# Patient Record
Sex: FEMALE | Race: WHITE | Employment: FULL TIME | URBAN - METROPOLITAN AREA
[De-identification: names, ages, dates, MRNs, and addresses within clinical notes are randomized per-mention and may not be internally consistent; named-entity substitution may affect disease eponyms.]

---

## 2019-06-05 ENCOUNTER — OFFICE VISIT (OUTPATIENT)
Dept: ENDOCRINOLOGY | Age: 59
End: 2019-06-05
Payer: COMMERCIAL

## 2019-06-05 VITALS
HEART RATE: 69 BPM | BODY MASS INDEX: 27.64 KG/M2 | DIASTOLIC BLOOD PRESSURE: 68 MMHG | HEIGHT: 66 IN | WEIGHT: 172 LBS | SYSTOLIC BLOOD PRESSURE: 117 MMHG

## 2019-06-05 DIAGNOSIS — E11.29 TYPE 2 DIABETES MELLITUS WITH MICROALBUMINURIA, WITHOUT LONG-TERM CURRENT USE OF INSULIN (HCC): ICD-10-CM

## 2019-06-05 DIAGNOSIS — E66.3 OVERWEIGHT (BMI 25.0-29.9): ICD-10-CM

## 2019-06-05 DIAGNOSIS — I10 ESSENTIAL HYPERTENSION: ICD-10-CM

## 2019-06-05 DIAGNOSIS — R80.9 TYPE 2 DIABETES MELLITUS WITH MICROALBUMINURIA, WITHOUT LONG-TERM CURRENT USE OF INSULIN (HCC): ICD-10-CM

## 2019-06-05 DIAGNOSIS — E55.9 VITAMIN D DEFICIENCY: ICD-10-CM

## 2019-06-05 DIAGNOSIS — R53.82 CHRONIC FATIGUE: ICD-10-CM

## 2019-06-05 LAB — HBA1C MFR BLD: 7.2 %

## 2019-06-05 PROCEDURE — 83036 HEMOGLOBIN GLYCOSYLATED A1C: CPT | Performed by: NURSE PRACTITIONER

## 2019-06-05 PROCEDURE — 99204 OFFICE O/P NEW MOD 45 MIN: CPT | Performed by: NURSE PRACTITIONER

## 2019-06-05 RX ORDER — DOXYCYCLINE 100 MG/1
TABLET ORAL
COMMUNITY
Start: 2019-05-31 | End: 2019-09-04 | Stop reason: ALTCHOICE

## 2019-06-05 RX ORDER — LISINOPRIL 10 MG/1
TABLET ORAL
Refills: 3 | COMMUNITY
Start: 2019-04-12

## 2019-06-05 RX ORDER — GLIMEPIRIDE 2 MG/1
TABLET ORAL
COMMUNITY
Start: 2019-05-22 | End: 2020-03-11 | Stop reason: ALTCHOICE

## 2019-06-05 ASSESSMENT — ENCOUNTER SYMPTOMS
DIARRHEA: 0
SHORTNESS OF BREATH: 0
NAUSEA: 0
COLOR CHANGE: 0
CONSTIPATION: 0
EYE PAIN: 0

## 2019-06-05 NOTE — PATIENT INSTRUCTIONS
A1c today was 7.2%  Goal A1c < 6.5 %      Approach for  your diet  1. Use Calorieking. com to look up carb counts as discussed at visit  2. Target for 20 grams of carbohydrates or less per meal, and plan for three meals a day  3. Snacks if consumed should be low in carb, and avoid all processed snacks as far as possible  4. Decrease carb consumption in beverage form: regular soda, fruit juices  5. Moderate protein and good fats are ok. --> make protein and good fats as the first part of the meal  6. Increase fiber via vegetables. Limit fruit intake. 7. Eliminate use of sugar as far as possible, minimize  all artificial sweeteners. 8. Ensure good hydration: 80 -100 oz minimum  9. Ensure electrolyte replacement: powerade or gatorade ZERO or pedialyte etc.    Physical Activity  Recommended exercise is 5-7 days a week for 30-60 mins at least, per day OR a total of 2.5 hours per week , which ever is more feasible for you    Diabetic Health Maintenance  Follow up with annual eye exams  Follow up with annual podiatry exams  Check feet daily and make sure injuries do not go unnoticed. Other areas of Diabetic Education reviewed:   Carbs: good carbs and bad carbs, importance of carb counting, incorporation of protein with each meal to reduce Glycemic index, importance of portions, Carb/insulin ratio   Fats: Good fats and bad fats, meal planning and supplements.  Discussed how food affects blood sugar readings.     Steroids and effects on blood sugars   Different diabetic medications   Managing high and low sugar readings

## 2019-06-05 NOTE — ASSESSMENT & PLAN NOTE
Lab Results   Component Value Date    LABA1C 7.2 06/05/2019     Significantly improved A1c with carb restriction and medication compliance  Discussed starting GLP1 : patient declined for now  Had recent labs, not available at visit  Diet : 20-30 grams per meal , 3 meals per day, avoid carbs in snack choices  Ensure adequate hydration and  electrolyte replacement    Exercise :Recommended exercise is 5-7 days a week for 30-60 mins at least, per day OR a total of 2.5 hours per week , which ever is more feasible for you    Diabetic Health Maintenance  Follow up with annual eye exams  Follow up with annual podiatry exams  Check feet daily and make sure injuries do not go unnoticed. Other areas of Diabetic Education reviewed:   Carbs: good carbs and bad carbs, importance of carb counting, incorporation of protein with each meal to reduce Glycemic index, importance of portions, Carb/insulin ratio   Fats: Good fats and bad fats, meal planning and supplements.  Discussed how food affects blood sugar readings.     Different diabetic medications   Managing high and low sugar readings   Effects of smoking and diabetes

## 2019-06-05 NOTE — PROGRESS NOTES
Endocrinology  [facility]ario [facility] [place]  Phone: [phone]   FAX: [phone]    Claudeen Mcmurray is a 63 y.o. female who is a new patient presenting for management of Diabetes Mellitus Type 2. Last A1C:   Lab Results   Component Value Date    LABA1C 7.2 2019    LABA1C 9.7 2014     Last BP Readings:  BP Readings from Last 3 Encounters:   19 117/68     Last LDL:   Lab Results   Component Value Date    LDLCALC 122 (H) 2014     Aspirin Use: no    Tobacco/Alcohol History:    Smoking status: Never Smoker    Smokeless tobacco: Never Used    Alcohol use: Not on file     Diabetes:  Patricia Yee  was diagnosed with diabetes type 2 in : at age 37   On insulin: no   Patient reports that diabetes is generally not well controlled. Control progressively worsened after her   ~  4 years ago.  Works 2nd shift at [address] [employer] course has been variable :    Current microvascular complications include microalbuminuria   Current Macrovascular complications include no h/o chest pain, CVD, CAD, PVD   Patient reports compliance for about 80% of the time and adheres to medication, but usually not to diet and exercise instructions.  There have been no recent hospital or ED admissions for hypoglycemia, hyperglycemia or DKA.   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, sees Dr Jaswinder Goyal, nephrology ,   [facility] Other ED visit include for : none    PMH includes  Past Medical History:   Diagnosis Date    Diabetes mellitus ([address]        Blood glucose trends:  Patient brought log glucometer for download  Readings per day  morning per patient report  BG Range 88 -149  Hypoglycemia awareness and symptoms: none  Has not done CGM    Current Medication regimen:   Metformin 800 mg TID  Glimepiride  2 mg BID  Other meds tried in past: none  GFR n/a    Current Dietary regimen:   Eats 5 times a day  BF : 2 slices of turkey quinn plus egg white  Snack : cup of greek yoghurt  Lunch: cabbage mix per renal diet  Snack: fruit strawberries  Beverages: water/unsweetened tea  Average carbs per day: ~ 100 , but was much higher previously    Microvascular Complications:  · Neuropathy: tingling and numbness  · Retinopathy: no diabetic eye ds. RE immature cataract  · Nephropathy: membraneous nephropathy    Diabetic Health Maintenance   · Last Eye Exam: Dr Pascual Anne, last year  · Last Foot exam: with PCP, no loss of sensation  · Has patient seen a dietitian? Yes  · Current Exercise: No structured exercise  · On ACEI or ARB:lisinopril 10 mg BID    · On statin: no    Hyperlipidemia: Current complaints include occasional myalgias but otherwise tolerates well. Lab Results   Component Value Date    CHOL 201 09/27/2014     Lab Results   Component Value Date    TRIG 40 09/27/2014     Lab Results   Component Value Date    HDL 71 09/27/2014     Lab Results   Component Value Date    LDLCALC 122 09/27/2014     No results found for: LDLDIRECT  No results found for: CHOLHDLRATIO    Vitamin D deficiency: Currently is on not on rx / otc supplementation. Current complaints include fatigue on daily basis. Last vitamin Dlevel is:  No results found for: VD23T, VITD3, VD25, VITD25    Hypertension  Controlled , denies symptoms of dizziness, light headedness. Occasional dependent edema. Tries to follow a salt restricted diet.      Lab Results   Component Value Date     (L) 09/27/2014    K 3.5 09/27/2014     09/27/2014    CO2 26 09/27/2014    BUN 8 09/27/2014    CREATININE 0.5 (L) 09/27/2014    GLUCOSE 232 (H) 09/27/2014    CALCIUM 8.6 09/27/2014    PROT 6.4 09/27/2014    LABALBU 3.7 09/27/2014    BILITOT 0.3 09/27/2014    ALKPHOS 77 09/27/2014    AST 14 (L) 09/27/2014    ALT 15 09/27/2014    LABGLOM >60 09/27/2014    GFRAA >60 09/27/2014    AGRATIO 1.4 09/27/2014    GLOB 2.7 09/27/2014     The ASCVD Risk score (Lenora Warner, et al., 2013) failed to calculate for the following reasons: Cannot find a previous HDL lab    Cannot find a previous total cholesterol lab    History reviewed. No pertinent family history. Review of Systems   Constitutional: Negative for activity change, appetite change, diaphoresis, fever and unexpected weight change. HENT: Negative for dental problem. Eyes: Negative for pain and visual disturbance. Respiratory: Negative for shortness of breath. Cardiovascular: Negative for chest pain, palpitations and leg swelling. Gastrointestinal: Negative for constipation, diarrhea and nausea. Endocrine: Negative for cold intolerance, heat intolerance, polydipsia, polyphagia and polyuria. Genitourinary: Negative for frequency and urgency. Musculoskeletal: Negative for arthralgias, joint swelling and myalgias. Skin: Negative for color change and pallor. Neurological: Negative for weakness, numbness and headaches. Psychiatric/Behavioral: Negative for dysphoric mood and sleep disturbance. The patient is not nervous/anxious. Vitals:    06/05/19 0831   BP: 117/68   Pulse: 69   Weight: 172 lb (78 kg)   Height: 5' 6\" (1.676 m)     Physical Exam   Constitutional: She is oriented to person, place, and time. She appears well-developed and well-nourished. Eyes: Pupils are equal, round, and reactive to light. Neck: Normal range of motion. No thyromegaly present. Cardiovascular: Normal rate, regular rhythm and normal heart sounds. Pulmonary/Chest: Effort normal and breath sounds normal.   Abdominal: She exhibits no distension. Musculoskeletal: Normal range of motion. She exhibits no edema. Neurological: She is alert and oriented to person, place, and time. No sensory deficit. Skin: Skin is warm and dry. No skin changes or evidence of trauma. Psychiatric: She has a normal mood and affect. Her behavior is normal. Thought content normal.   Vitals reviewed. Skeletal foot exam: No skin lesions are noted. Skin is normal.  Calluses are not noted. WITH REFLEX TO FT4     DIABETES FOOT EXAM (Completed)    VITAMIN B12 & FOLATE    C-Peptide    Glutamic Acid Decarboxylase    INSULIN ANTIBODY    Essential hypertension     Blood pressure controlled. Continue current regimen         Relevant Medications    lisinopril (PRINIVIL;ZESTRIL) 10 MG tablet    Overweight (BMI 25.0-29. 9)     Has lost ~ 8 lbs with lower carb diet  Eats 5 small meals a day         Vitamin D deficiency     Ongoing fatigue   On 400 IU QD         Relevant Orders    Vitamin D 25 Hydroxy    Chronic fatigue     Reports ongoing fatigue > 1 year  Previous thyroid labs in ref range  Sleep is adequate, does not snore  Previously low B12 levels, does not currently suplement         Relevant Orders    TSH WITH REFLEX TO FT4    VITAMIN B12 & FOLATE        Greater than 40 minutes spent directly counseling patient about topics listed above (such as lifestyle modifications, preventative screenings and/or disease related processes. Return in about 1 month (around 7/5/2019).

## 2019-08-23 ENCOUNTER — HOSPITAL ENCOUNTER (OUTPATIENT)
Age: 59
Discharge: HOME | End: 2019-08-23
Payer: COMMERCIAL

## 2019-08-23 DIAGNOSIS — R53.82 CHRONIC FATIGUE: ICD-10-CM

## 2019-08-23 DIAGNOSIS — E11.29 TYPE 2 DIABETES MELLITUS WITH MICROALBUMINURIA, WITHOUT LONG-TERM CURRENT USE OF INSULIN (HCC): ICD-10-CM

## 2019-08-23 DIAGNOSIS — E55.9 VITAMIN D DEFICIENCY: ICD-10-CM

## 2019-08-23 DIAGNOSIS — R80.9 TYPE 2 DIABETES MELLITUS WITH MICROALBUMINURIA, WITHOUT LONG-TERM CURRENT USE OF INSULIN (HCC): ICD-10-CM

## 2019-08-23 LAB
CHOLESTEROL, TOTAL: 283 MG/DL (ref 0–199)
CREATININE URINE: 23.6 MG/DL (ref 28–259)
FOLATE: 7.92 NG/ML (ref 4.78–24.2)
HDLC SERPL-MCNC: 75 MG/DL (ref 40–60)
LDL CHOLESTEROL CALCULATED: 195 MG/DL
MICROALBUMIN UR-MCNC: 13 MG/DL
MICROALBUMIN/CREAT UR-RTO: 550.8 MG/G (ref 0–30)
TRIGL SERPL-MCNC: 67 MG/DL (ref 0–150)
TSH REFLEX FT4: 3.58 UIU/ML (ref 0.27–4.2)
VITAMIN B-12: 896 PG/ML (ref 211–911)
VITAMIN D 25-HYDROXY: 24.5 NG/ML
VLDLC SERPL CALC-MCNC: 13 MG/DL

## 2019-08-23 PROCEDURE — 82043 UR ALBUMIN QUANTITATIVE: CPT

## 2019-08-23 PROCEDURE — 82306 VITAMIN D 25 HYDROXY: CPT

## 2019-08-23 PROCEDURE — 36415 COLL VENOUS BLD VENIPUNCTURE: CPT

## 2019-08-23 PROCEDURE — 82607 VITAMIN B-12: CPT

## 2019-08-23 PROCEDURE — 86341 ISLET CELL ANTIBODY: CPT

## 2019-08-23 PROCEDURE — 82570 ASSAY OF URINE CREATININE: CPT

## 2019-08-23 PROCEDURE — 86337 INSULIN ANTIBODIES: CPT

## 2019-08-23 PROCEDURE — 84681 ASSAY OF C-PEPTIDE: CPT

## 2019-08-23 PROCEDURE — 80061 LIPID PANEL: CPT

## 2019-08-23 PROCEDURE — 82746 ASSAY OF FOLIC ACID SERUM: CPT

## 2019-08-23 PROCEDURE — 84443 ASSAY THYROID STIM HORMONE: CPT

## 2019-08-25 LAB
GLUTAMIC ACID DECARB AB: <5 IU/ML (ref 0–5)
INSULIN A: <0.4 U/ML (ref 0–0.4)

## 2019-08-28 LAB — C-PEPTIDE: 1.6 NG/ML (ref 1.1–4.4)

## 2019-09-04 ENCOUNTER — OFFICE VISIT (OUTPATIENT)
Dept: ENDOCRINOLOGY | Age: 59
End: 2019-09-04
Payer: COMMERCIAL

## 2019-09-04 VITALS
WEIGHT: 174.2 LBS | SYSTOLIC BLOOD PRESSURE: 99 MMHG | BODY MASS INDEX: 28 KG/M2 | HEIGHT: 66 IN | HEART RATE: 65 BPM | DIASTOLIC BLOOD PRESSURE: 63 MMHG | OXYGEN SATURATION: 100 %

## 2019-09-04 DIAGNOSIS — R80.9 TYPE 2 DIABETES MELLITUS WITH MICROALBUMINURIA, WITHOUT LONG-TERM CURRENT USE OF INSULIN (HCC): Primary | ICD-10-CM

## 2019-09-04 DIAGNOSIS — R53.82 CHRONIC FATIGUE: ICD-10-CM

## 2019-09-04 DIAGNOSIS — I10 ESSENTIAL HYPERTENSION: ICD-10-CM

## 2019-09-04 DIAGNOSIS — E55.9 VITAMIN D DEFICIENCY: ICD-10-CM

## 2019-09-04 DIAGNOSIS — E11.29 TYPE 2 DIABETES MELLITUS WITH MICROALBUMINURIA, WITHOUT LONG-TERM CURRENT USE OF INSULIN (HCC): Primary | ICD-10-CM

## 2019-09-04 DIAGNOSIS — E66.3 OVERWEIGHT (BMI 25.0-29.9): ICD-10-CM

## 2019-09-04 LAB — HBA1C MFR BLD: 5.8 %

## 2019-09-04 PROCEDURE — 83036 HEMOGLOBIN GLYCOSYLATED A1C: CPT | Performed by: NURSE PRACTITIONER

## 2019-09-04 PROCEDURE — 99214 OFFICE O/P EST MOD 30 MIN: CPT | Performed by: NURSE PRACTITIONER

## 2019-09-04 ASSESSMENT — ENCOUNTER SYMPTOMS
SHORTNESS OF BREATH: 0
EYE PAIN: 0
NAUSEA: 0
DIARRHEA: 0
COLOR CHANGE: 0
CONSTIPATION: 0

## 2019-09-04 NOTE — ASSESSMENT & PLAN NOTE
Reviewed labs  Low in serum protein and albumin   High in urine microalbumin  H/o membraneous nephropathy  Will discuss protein replacement with nephrology

## 2019-09-04 NOTE — PATIENT INSTRUCTIONS
HbA1c  4.0 4.1 4.2 4.3 4.4 4.5 4.6 4.7 4.8 4.9  Glucose 68 71 74 77 80 82 85 88 91 94    HbA1c  5.0 5.1 5.2 5.3 5.4 5.5 5.6 5.7 5.8 5.9  Glucose 97 100 103 105 108 111 114 117 120 123    HbA1c  6.0 6.1 6.2 6.3 6.4 6.5 6.6 6.7 6.8 6.9  Glucose 125 128 131 134 137 140 143 146 148 151    HbA1c  7.0 7.1 7.2 7.3 7.4 7.5 7.6 7.7 7.8 7.9  Glucose 154 157 160 163 166 169 171 174 177 180    HbA1c  8.0 8.1 8.2 8.3 8.4 8.5 8.6 8.7 8.8 8.9  Glucose 183 186 189 192 194 197 200 203 206 209    HbA1c  9.0 9.1 9.2 9.3 9.4 9.5 9.6 9.7 9.8 9.9  Glucose 212 214 217 220 223 226 229 232 235 237    HbA1c  10.0 10.1 10.2 10.3 10.4 10.5 10.6 10.7 10.8 10.9  Glucose 240 243 246 249 252 255 258 260 263 266    HbA1c  11.0 11.1 11.2 11.3 11.4 11.5 11.6 11.7 11.8 11.9  Glucose 269 272 275 278 280 283 286 289 292 295    HbA1c  12.0 12.1 12.2 12.3 12.4 12.5 12.6 12.7 12.8 12.9  Glucose 298 301 303 306 309 312 315 318 321 324    HbA1c  13.0 13.1 13.2 13.3 13.4 13.5 13.6 13.7 13.8 13.9  Glucose 326 329 332 335 338 341 344 346 349 352

## 2019-09-04 NOTE — ASSESSMENT & PLAN NOTE
Current BMI is 28.12  Goal is < 25 BMI  Reviewed carbohyrate and caloric restriction  Emphasis on former at this time  Ensure good hydration and adequate electrolyte replacement

## 2019-09-04 NOTE — PROGRESS NOTES
800 mg TID--> takes BID  Glimepiride  2 mg QD  Other meds tried in past: none  GFR n/a    Current Dietary regimen:   Eats 5 times a day  BF : 2 slices of turkey quinn plus egg white  Snack : cup of greek yoghurt  Lunch: cabbage mix per renal diet  Snack: fruit strawberries  Beverages: water/unsweetened tea  Average carbs per day: ~ 100 , but was much higher previously    Microvascular Complications:  · Neuropathy: tingling and numbness  · Retinopathy: no diabetic eye ds. RE immature cataract  · Nephropathy: membraneous nephropathy  Component      Latest Ref Rng & Units 8/23/2019   Microalbumin, Random Urine      <2.0 mg/dL 13.00 (H)   Creatinine, Ur      28.0 - 259.0 mg/dL 23.6 (L)   Microalbumin Creatinine Ratio      0.0 - 30.0 mg/g 550.8 (H)     Diabetic Health Maintenance   · Last Eye Exam: Dr Vin Noe, last year  · Last Foot exam: with PCP, no loss of sensation  · Has patient seen a dietitian? Yes  · Current Exercise: No structured exercise  · On ACEI or ARB:lisinopril 10 mg BID    · On statin: no    Hyperlipidemia: Current complaints include occasional myalgias but otherwise tolerates well. Lab Results   Component Value Date    CHOL 283 08/23/2019    CHOL 201 09/27/2014     Lab Results   Component Value Date    TRIG 67 08/23/2019    TRIG 40 09/27/2014     Lab Results   Component Value Date    HDL 75 08/23/2019    HDL 71 09/27/2014     Lab Results   Component Value Date    LDLCALC 195 08/23/2019    [address] 122 09/27/2014     No results found for: LDLDIRECT  No results found for: CHOLHDLRATIO    Vitamin D deficiency: Currently is on not on rx / otc supplementation. Current complaints include fatigue on daily basis. Last vitamin Dlevel is:  Lab Results   Component Value Date    VITD25 24.5 08/23/2019       Hypertension  Controlled , denies symptoms of dizziness, light headedness. Occasional dependent edema. Tries to follow a salt restricted diet.      Lab Results   Component Value Date     (L)

## 2020-03-11 ENCOUNTER — OFFICE VISIT (OUTPATIENT)
Dept: ENDOCRINOLOGY | Age: 60
End: 2020-03-11
Payer: COMMERCIAL

## 2020-03-11 VITALS
DIASTOLIC BLOOD PRESSURE: 68 MMHG | WEIGHT: 170.8 LBS | BODY MASS INDEX: 27.45 KG/M2 | SYSTOLIC BLOOD PRESSURE: 115 MMHG | OXYGEN SATURATION: 97 % | HEIGHT: 66 IN | HEART RATE: 65 BPM

## 2020-03-11 LAB — HBA1C MFR BLD: 6.6 %

## 2020-03-11 PROCEDURE — 99214 OFFICE O/P EST MOD 30 MIN: CPT | Performed by: INTERNAL MEDICINE

## 2020-03-11 PROCEDURE — 83036 HEMOGLOBIN GLYCOSYLATED A1C: CPT | Performed by: INTERNAL MEDICINE

## 2020-03-11 RX ORDER — PRAVASTATIN SODIUM 20 MG
20 TABLET ORAL DAILY
Qty: 30 TABLET | Refills: 5 | Status: SHIPPED | OUTPATIENT
Start: 2020-03-11 | End: 2020-09-09

## 2020-03-11 RX ORDER — MULTIVIT-MIN/IRON/FOLIC ACID/K 18-600-40
1000 CAPSULE ORAL WEEKLY
COMMUNITY

## 2020-03-11 RX ORDER — METFORMIN HYDROCHLORIDE 500 MG/1
500 TABLET, EXTENDED RELEASE ORAL
COMMUNITY
Start: 2020-02-09 | End: 2020-03-11

## 2020-03-11 RX ORDER — OMEGA-3/DHA/EPA/FISH OIL 500-1000MG
500 CAPSULE ORAL DAILY
COMMUNITY
End: 2020-03-11

## 2020-03-11 SDOH — HEALTH STABILITY: MENTAL HEALTH: HOW OFTEN DO YOU HAVE A DRINK CONTAINING ALCOHOL?: NEVER

## 2020-03-11 ASSESSMENT — ENCOUNTER SYMPTOMS
BACK PAIN: 0
COUGH: 0
PHOTOPHOBIA: 0

## 2020-03-11 NOTE — PROGRESS NOTES
Endocrinology  Jarocho Chance M.D. Phone: [phone]   FAX: [phone]       Lili Salinas   YOB: 1960    Date of Visit:  3/11/2020    Allergies   Allergen Reactions    Amoxicillin Rash    Augmentin [Amoxicillin-Pot Clavulanate] Rash     Outpatient Medications Marked as Taking for the 3/11/20 encounter (Office Visit) with Kofi Pelayo MD   Medication Sig Dispense Refill    metFORMIN (GLUCOPHAGE-XR) 500 MG extended release tablet Take 500 mg by mouth daily (with breakfast)       metFORMIN (GLUCOPHAGE) 850 MG tablet Take 850 mg by mouth 2 times daily (with meals)      Vitamin D, Cholecalciferol, 25 MCG (1000 UT) TABS Take 1,000 Units by mouth daily      Omega-3 Fatty Acids (OMEGA 3 500) 500 MG CAPS Take 500 mg by mouth daily      lisinopril (PRINIVIL;ZESTRIL) 10 MG tablet TAKE 1 TABLET BY MOUTH EVERY DAY  3         Vitals:    03/11/20 0856   BP: 115/68   Site: Right Upper Arm   Position: Sitting   Cuff Size: Medium Adult   Pulse: 65   SpO2: 97%   Weight: 170 lb 12.8 oz (77.5 kg)   Height: 5' 6\" (1.676 m)     Body mass index is 27.57 kg/m². Wt Readings from Last 3 Encounters:   03/11/20 170 lb 12.8 oz (77.5 kg)   09/04/19 174 lb 3.2 oz (79 kg)   06/05/19 172 lb (78 kg)     BP Readings from Last 3 Encounters:   03/11/20 115/68   09/04/19 99/63   06/05/19 117/68        Past Medical History:   Diagnosis Date    Diabetes mellitus ([facility] [address]      History reviewed. No pertinent surgical history. History reviewed. No pertinent family history. Social History     Tobacco Use   Smoking Status Never Smoker   Smokeless Tobacco Never Used      Social History     Substance and Sexual Activity   Alcohol Use Never    Frequency: Never       HPI      Lili Salinas is a 63 y.o. female who is here for  management uncontrolled DM. PCP :  DELORIS Bueno CNP    Patient has a PMH of Type 2 DM, hypertension, hyperlipidemia    Saw [facility] [place] in 2019.      Diagnosed with Diabetes Psychiatric:         Behavior: Behavior normal.         Thought Content: Thought content normal.           Lab Results   Component Value Date    LABA1C 5.8 09/04/2019         Assessment/Plan      1. Type 2 DM     Henny Nickerson is a 63 y.o. female has Type 2 DM   Controlled. A1c 5.8 % ---> 6.6 %       -Continue metformin 850 mg BID  -DC metformin XR        Advised follow-up with the ophthalmologist once year. Urine microalbumin/cr ratio normal high in 08/19 . On lisinopril. Discussed foot care. 2. Hypertension. BP at goal. On lisinopril 10 mg daily. 3. Hyperlipidemia. LDL high  HDL and TGD normal.     Discussed need to reduce the LDL. She had joint pains with atorvastatin    Will start her on paravastatin 20 mg daily. 4. Membranous nephropathy. As per nephrology. Given this was her first visit with me, reviewed history, previous office notes.

## 2020-09-09 RX ORDER — PRAVASTATIN SODIUM 20 MG
TABLET ORAL
Qty: 30 TABLET | Refills: 4 | Status: SHIPPED | OUTPATIENT
Start: 2020-09-09

## 2020-09-09 NOTE — TELEPHONE ENCOUNTER
Medication:   Requested Prescriptions     Pending Prescriptions Disp Refills    pravastatin (PRAVACHOL) 20 MG tablet [Pharmacy Med Name: PRAVASTATIN SODIUM 20 MG TAB] 30 tablet 4     Sig: TAKE ONE TABLET BY MOUTH DAILY         Last appt: 3/11/2020   Next appt: 9/11/2020    Last OARRS: No flowsheet data found.

## 2020-09-11 ENCOUNTER — OFFICE VISIT (OUTPATIENT)
Dept: ENDOCRINOLOGY | Age: 60
End: 2020-09-11
Payer: COMMERCIAL

## 2020-09-11 VITALS
BODY MASS INDEX: 28.61 KG/M2 | WEIGHT: 178 LBS | OXYGEN SATURATION: 100 % | DIASTOLIC BLOOD PRESSURE: 78 MMHG | HEIGHT: 66 IN | HEART RATE: 60 BPM | SYSTOLIC BLOOD PRESSURE: 131 MMHG

## 2020-09-11 PROCEDURE — 99214 OFFICE O/P EST MOD 30 MIN: CPT | Performed by: INTERNAL MEDICINE

## 2020-09-11 ASSESSMENT — ENCOUNTER SYMPTOMS
PHOTOPHOBIA: 0
COUGH: 0
BACK PAIN: 0

## 2020-09-11 NOTE — PROGRESS NOTES
Endocrinology  Gildardo Nevarez M.D. Phone: [phone]   FAX: [phone]       Ottoniel Pereira   YOB: 1960    Date of Visit:  9/11/2020    Allergies   Allergen Reactions    Amoxicillin Rash    Augmentin [Amoxicillin-Pot Clavulanate] Rash     Outpatient Medications Marked as Taking for the 9/11/20 encounter (Office Visit) with Keshav Moseley MD   Medication Sig Dispense Refill    pravastatin (PRAVACHOL) 20 MG tablet TAKE ONE TABLET BY MOUTH DAILY 30 tablet 4    metFORMIN (GLUCOPHAGE) 850 MG tablet Take 850 mg by mouth 2 times daily (with meals)      Vitamin D, Cholecalciferol, 25 MCG (1000 UT) TABS Take 1,000 Units by mouth once a week       lisinopril (PRINIVIL;ZESTRIL) 10 MG tablet TAKE 1 TABLET BY MOUTH EVERY DAY  3         Vitals:    09/11/20 0930   BP: 131/78   Site: Right Upper Arm   Position: Sitting   Cuff Size: Medium Adult   Pulse: 60   SpO2: 100%   Weight: 178 lb (80.7 kg)   Height: 5' 6\" (1.676 m)     Body mass index is 28.73 kg/m². Wt Readings from Last 3 Encounters:   09/11/20 178 lb (80.7 kg)   03/11/20 170 lb 12.8 oz (77.5 kg)   09/04/19 174 lb 3.2 oz (79 kg)     BP Readings from Last 3 Encounters:   09/11/20 131/78   03/11/20 115/68   09/04/19 99/63        Past Medical History:   Diagnosis Date    Diabetes mellitus ([facility] [address]      History reviewed. No pertinent surgical history. History reviewed. No pertinent family history. Social History     Tobacco Use   Smoking Status Never Smoker   Smokeless Tobacco Never Used      Social History     Substance and Sexual Activity   Alcohol Use Never    Frequency: Never       HPI      Ottoniel Pereira is a 62 y.o. female who is here for  management uncontrolled DM. PCP :  DELORIS Regan CNP    Patient has a PMH of Type 2 DM, hypertension, hyperlipidemia    Saw Alex Shahid in 2019. Diagnosed with Diabetes Mellitus type 2 at the age of 37 yrs. ,  Course has been variable .   Microvascular complications: No

## 2020-11-20 ENCOUNTER — TELEPHONE (OUTPATIENT)
Dept: ENDOCRINOLOGY | Age: 60
End: 2020-11-20

## 2020-11-20 NOTE — TELEPHONE ENCOUNTER
Called pt and l/m per Dr. Vero Kenney: Please advise patient her labs look good and to continue same medications.   11/20/2020 @ 200pm.

## 2021-03-17 ENCOUNTER — OFFICE VISIT (OUTPATIENT)
Dept: ENDOCRINOLOGY | Age: 61
End: 2021-03-17
Payer: COMMERCIAL

## 2021-03-17 VITALS
BODY MASS INDEX: 31.02 KG/M2 | OXYGEN SATURATION: 98 % | DIASTOLIC BLOOD PRESSURE: 76 MMHG | HEIGHT: 66 IN | WEIGHT: 193 LBS | SYSTOLIC BLOOD PRESSURE: 122 MMHG | HEART RATE: 73 BPM

## 2021-03-17 DIAGNOSIS — E66.3 OVERWEIGHT (BMI 25.0-29.9): ICD-10-CM

## 2021-03-17 DIAGNOSIS — R80.9 TYPE 2 DIABETES MELLITUS WITH MICROALBUMINURIA, WITHOUT LONG-TERM CURRENT USE OF INSULIN (HCC): Primary | ICD-10-CM

## 2021-03-17 DIAGNOSIS — E11.29 TYPE 2 DIABETES MELLITUS WITH MICROALBUMINURIA, WITHOUT LONG-TERM CURRENT USE OF INSULIN (HCC): Primary | ICD-10-CM

## 2021-03-17 DIAGNOSIS — I10 ESSENTIAL HYPERTENSION: ICD-10-CM

## 2021-03-17 DIAGNOSIS — E55.9 VITAMIN D DEFICIENCY: ICD-10-CM

## 2021-03-17 PROCEDURE — 99213 OFFICE O/P EST LOW 20 MIN: CPT | Performed by: NURSE PRACTITIONER

## 2021-03-17 ASSESSMENT — ENCOUNTER SYMPTOMS
EYE PAIN: 0
CONSTIPATION: 0
SHORTNESS OF BREATH: 0
COLOR CHANGE: 0
DIARRHEA: 0
NAUSEA: 0

## 2021-03-17 NOTE — ASSESSMENT & PLAN NOTE
Gained ~ 15 lbs  Reviewed carbohyrate and caloric restriction  Emphasis on former at this time  Ensure good hydration and adequate electrolyte replacement

## 2021-03-17 NOTE — PROGRESS NOTES
Endocrinology  [place], [place]  Phone: [phone]   FAX: [phone]    Pradip Ernandez is a 62 y.o. female who is following up for management of Diabetes Mellitus Type 2. Last A1C:   Lab Results   Component Value Date    LABA1C 6.6 2020    LABA1C 5.8 2019    LABA1C 7.2 2019     Last BP Readings:  BP Readings from Last 3 Encounters:   21 122/76   20 131/78   20 115/68     Last LDL:   Lab Results   Component Value Date    LDLCALC 195 (H) 2019     Aspirin Use: no    Tobacco/Alcohol History:    Smoking status: Never Smoker    Smokeless tobacco: Never Used    Alcohol use: Not on file     Diabetes:  Bridger Cortés  was diagnosed with diabetes type 2 in : at age 37   On insulin: no   Patient reports that diabetes is generally not well controlled. Control progressively worsened after her   ~  4 years ago.  Works 2nd shift at [address] [employer] course has been variable :    Current microvascular complications include microalbuminuria   Current Macrovascular complications include no h/o chest pain, CVD, CAD, PVD   Patient reports compliance for about 80% of the time and adheres to medication, but usually not to diet and exercise instructions.  There have been no recent hospital or ED admissions for hypoglycemia, hyperglycemia or DKA.   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, sees Dr Maritza Bustillos, nephrology ,   Christopher Rich Other ED visit include for : none    PMH includes  Past Medical History:   Diagnosis Date    Diabetes mellitus ([address]      Component      Latest Ref Rng & Units 2019   C-Peptide      1.1 - 4.4 ng/mL 1.6   Glutamic Acid Decarb Ab      0.0 - 5.0 IU/mL <5.0   INSULIN A      0.0 - 0.4 U/mL <0.4     Blood glucose trends:  Patient brought log glucometer for download  Readings per day  morning per patient report  BG Range 88 -149  Hypoglycemia awareness and symptoms: none  Has not done CGM    Current Medication regimen:   Metformin 800 mg TID--> takes BID  Glimepiride  2 mg QD  Other meds tried in past: none  GFR n/a    Current Dietary regimen:   Eats 5 times a day  BF : 2 slices of turkey quinn plus egg white  Snack : cup of greek yoghurt  Lunch: cabbage mix per renal diet  Snack: fruit strawberries  Beverages: water/unsweetened tea  Average carbs per day: ~ 100 , but was much higher previously    Microvascular Complications:  · Neuropathy: tingling and numbness  · Retinopathy: no diabetic eye ds. RE immature cataract  · Nephropathy: membraneous nephropathy  Component      Latest Ref Rng & Units 8/23/2019   Microalbumin, Random Urine      <2.0 mg/dL 13.00 (H)   Creatinine, Ur      28.0 - 259.0 mg/dL 23.6 (L)   Microalbumin Creatinine Ratio      0.0 - 30.0 mg/g 550.8 (H)     Diabetic Health Maintenance   · Last Eye Exam: Dr Reno Madrid, last year  · Last Foot exam: with PCP, no loss of sensation  · Has patient seen a dietitian? Yes  · Current Exercise: No structured exercise  · On ACEI or ARB:lisinopril 10 mg BID    · On statin: no    Hyperlipidemia: Current complaints include occasional myalgias but otherwise tolerates well. Lab Results   Component Value Date    CHOL 283 08/23/2019    CHOL 201 09/27/2014     Lab Results   Component Value Date    TRIG 67 08/23/2019    TRIG 40 09/27/2014     Lab Results   Component Value Date    HDL 75 08/23/2019    HDL 71 09/27/2014     Lab Results   Component Value Date    LDLCALC 195 08/23/2019    [address] 09/27/2014     No results found for: LDLDIRECT  No results found for: CHOLHDLRATIO    Vitamin D deficiency: Currently is on not on rx / otc supplementation. Current complaints include fatigue on daily basis. Last vitamin Dlevel is:  Lab Results   Component Value Date    VITD25 24.5 08/23/2019       Hypertension  Controlled , denies symptoms of dizziness, light headedness. Occasional dependent edema. Tries to follow a salt restricted diet.      Lab Results   Component Value Date     (L) 09/27/2014    K 3.5 09/27/2014     09/27/2014    CO2 26 09/27/2014    BUN 8 09/27/2014    CREATININE 0.5 (L) 09/27/2014    GLUCOSE 232 (H) 09/27/2014    CALCIUM 8.6 09/27/2014    PROT 6.4 09/27/2014    LABALBU 3.7 09/27/2014    BILITOT 0.3 09/27/2014    ALKPHOS 77 09/27/2014    AST 14 (L) 09/27/2014    ALT 15 09/27/2014    LABGLOM >60 09/27/2014    GFRAA >60 09/27/2014    AGRATIO 1.4 09/27/2014    GLOB 2.7 09/27/2014     The 10-year ASCVD risk score (Glory Joseph, et al., 2013) is: 7.4%    Values used to calculate the score:      Age: 62 years      Sex: Female      Is Non- : No      Diabetic: Yes      Tobacco smoker: No      Systolic Blood Pressure: 926 mmHg      Is BP treated: Yes      HDL Cholesterol: 75 mg/dL      Total Cholesterol: 283 mg/dL    No family history on file. Review of Systems   Constitutional: Negative for activity change, appetite change, diaphoresis, fever and unexpected weight change. HENT: Negative for dental problem. Eyes: Negative for pain and visual disturbance. Respiratory: Negative for shortness of breath. Cardiovascular: Negative for chest pain, palpitations and leg swelling. Gastrointestinal: Negative for constipation, diarrhea and nausea. Endocrine: Negative for cold intolerance, heat intolerance, polydipsia, polyphagia and polyuria. Genitourinary: Negative for frequency and urgency. Musculoskeletal: Negative for arthralgias, joint swelling and myalgias. Skin: Negative for color change and pallor. Neurological: Negative for weakness, numbness and headaches. Psychiatric/Behavioral: Negative for dysphoric mood and sleep disturbance. The patient is not nervous/anxious. Vitals:    03/17/21 0855   BP: 122/76   Pulse: 73   SpO2: 98%   Weight: 193 lb (87.5 kg)   Height: 5' 6\" (1.676 m)     Physical Exam  Vitals signs reviewed. Constitutional:       Appearance: She is well-developed.    Eyes:      Pupils: Pupils are equal, round, and reactive to light. Neck:      Musculoskeletal: Normal range of motion. Thyroid: No thyromegaly. Cardiovascular:      Rate and Rhythm: Normal rate and regular rhythm. Heart sounds: Normal heart sounds. Pulmonary:      Effort: Pulmonary effort is normal.      Breath sounds: Normal breath sounds. Abdominal:      General: There is no distension. Musculoskeletal: Normal range of motion. Skin:     General: Skin is warm and dry. Comments: No skin changes or evidence of trauma. Neurological:      Mental Status: She is alert and oriented to person, place, and time. Sensory: No sensory deficit. Psychiatric:         Behavior: Behavior normal.         Thought Content: Thought content normal.       Skeletal foot exam: No skin lesions are noted. Skin is normal.  Calluses are not noted. Toenails are normal. Pulses are +2 DP bilaterally. Skeletal structures are intact upon visual examination. Sensory: 10 g monofilament is 10/10 on the right and 10/10 on the left, 128 Hz vibration sense is present bilaterally. Helena Gramajo is a 62 y.o. female with uncontrolled Diabetes Mellitus type 2 complicated by peripheral neuropathy and microalbuminuria  and associated with HTN and CKD.     Plan  Problem List Items Addressed This Visit     Vitamin D deficiency    Type 2 diabetes mellitus with kidney complication, without long-term current use of insulin ([facility] [address] - Primary     Lab Results   Component Value Date    LABA1C 6.6 03/11/2020     Goal A1c  < 6.5%  Has increased from previous  Will trial Geeta   Patient wants to re-implement diet and lifestyle changes  Avoid hypoglycemia    Diet :   30-40 grams per meal , 3 meals per day, avoid carbs in snack choices  Include moderate proteins and good fats   Ensure adequate hydration and  electrolyte replacement    Exercise :  Recommended exercise is 5-7 days a week for 30-60 mins at least, per day OR a total of 2.5 hours per week , which ever is more feasible. Diabetic Health Maintenance  Follow up with annual eye exams  Follow up with annual podiatry exams if needed  Reviewed sick day management of BG     Other areas of Diabetic Education reviewed:   Carbs: good carbs and bad carbs, importance of carb counting, incorporation of protein with each meal to reduce Glycemic index, importance of portions, Carb/insulin ratio   Fats: Good fats and bad fats, meal planning and supplements.  Discussed how food affects blood sugar readings.  Different diabetic medications   Managing high and low sugar readings             Relevant Medications    SITagliptin (JANUVIA) 25 MG tablet    Overweight (BMI 25.0-29. 9)     Gained ~ 15 lbs  Reviewed carbohyrate and caloric restriction  Emphasis on former at this time  Ensure good hydration and adequate electrolyte replacement           Essential hypertension     Has membraneous glomerular disease per patient           Greater than 25 minutes spent directly counseling patient about topics listed above (such as lifestyle modifications, preventative screenings and/or disease related processes. Return in about 3 months (around 6/17/2021).

## 2021-03-17 NOTE — ASSESSMENT & PLAN NOTE
Lab Results   Component Value Date    LABA1C 6.6 03/11/2020     Goal A1c  < 6.5%  Has increased from previous  Will trial Geeta   Patient wants to re-implement diet and lifestyle changes  Avoid hypoglycemia    Diet :   30-40 grams per meal , 3 meals per day, avoid carbs in snack choices  Include moderate proteins and good fats   Ensure adequate hydration and  electrolyte replacement    Exercise :  Recommended exercise is 5-7 days a week for 30-60 mins at least, per day OR a total of 2.5 hours per week , which ever is more feasible. Diabetic Health Maintenance  Follow up with annual eye exams  Follow up with annual podiatry exams if needed  Reviewed sick day management of BG     Other areas of Diabetic Education reviewed:   Carbs: good carbs and bad carbs, importance of carb counting, incorporation of protein with each meal to reduce Glycemic index, importance of portions, Carb/insulin ratio   Fats: Good fats and bad fats, meal planning and supplements.  Discussed how food affects blood sugar readings.     Different diabetic medications   Managing high and low sugar readings